# Patient Record
Sex: FEMALE | Race: BLACK OR AFRICAN AMERICAN | NOT HISPANIC OR LATINO | RURAL
[De-identification: names, ages, dates, MRNs, and addresses within clinical notes are randomized per-mention and may not be internally consistent; named-entity substitution may affect disease eponyms.]

---

## 2022-03-23 ENCOUNTER — NEW PATIENT (OUTPATIENT)
Dept: RURAL CLINIC 1 | Facility: CLINIC | Age: 57
End: 2022-03-23

## 2022-03-23 DIAGNOSIS — H52.13: ICD-10-CM

## 2022-03-23 DIAGNOSIS — H25.093: ICD-10-CM

## 2022-03-23 DIAGNOSIS — H52.4: ICD-10-CM

## 2022-03-23 PROCEDURE — 92015 DETERMINE REFRACTIVE STATE: CPT

## 2022-03-23 PROCEDURE — 99204 OFFICE O/P NEW MOD 45 MIN: CPT

## 2022-03-23 ASSESSMENT — TONOMETRY
OS_IOP_MMHG: 14
OD_IOP_MMHG: 14

## 2022-03-23 ASSESSMENT — VISUAL ACUITY
OD_CC: 20/40
OS_CC: 20/25
OU_CC: 20/20-1
OU_CC: J5

## 2023-06-30 NOTE — PATIENT DISCUSSION
Patient given Rx for glasses.
Patient understands condition, prognosis and need for follow up care.
Plan: Patient to complete records release form following todays visit to obtain most recent labs. Discussed oral Minoxidil as possible treatment or performing scalp biopsy for definitive DX. Patient to discuss oral Minoxidil with PCP.
Detail Level: Zone
Render In Strict Bullet Format?: No
Initiate Treatment: Begin the following treatments: Recommended OTC Rogaine 5% Foam - Apply to scalp QD. \\n\\n\\nRTC in 3 months; however patient instructed to contact office if worsening.

## 2024-03-25 ENCOUNTER — ESTABLISHED PATIENT (OUTPATIENT)
Dept: RURAL CLINIC 1 | Facility: CLINIC | Age: 59
End: 2024-03-25

## 2024-03-25 DIAGNOSIS — H25.093: ICD-10-CM

## 2024-03-25 DIAGNOSIS — H16.223: ICD-10-CM

## 2024-03-25 PROCEDURE — 92014 COMPRE OPH EXAM EST PT 1/>: CPT

## 2024-03-25 ASSESSMENT — VISUAL ACUITY
OD_CC: 20/20
OU_CC: 20/20
OS_CC: 20/20

## 2024-03-25 ASSESSMENT — TONOMETRY
OD_IOP_MMHG: 16
OS_IOP_MMHG: 16

## 2025-04-09 ENCOUNTER — COMPREHENSIVE EXAM (OUTPATIENT)
Age: 60
End: 2025-04-09

## 2025-04-09 DIAGNOSIS — H25.093: ICD-10-CM

## 2025-04-09 DIAGNOSIS — H16.223: ICD-10-CM

## 2025-04-09 PROCEDURE — 92014 COMPRE OPH EXAM EST PT 1/>: CPT
